# Patient Record
Sex: FEMALE | Race: WHITE | Employment: UNEMPLOYED | URBAN - METROPOLITAN AREA
[De-identification: names, ages, dates, MRNs, and addresses within clinical notes are randomized per-mention and may not be internally consistent; named-entity substitution may affect disease eponyms.]

---

## 2019-01-14 ENCOUNTER — OFFICE VISIT (OUTPATIENT)
Dept: FAMILY MEDICINE CLINIC | Age: 84
End: 2019-01-14
Payer: MEDICARE

## 2019-01-14 VITALS
OXYGEN SATURATION: 98 % | WEIGHT: 130 LBS | BODY MASS INDEX: 25.39 KG/M2 | HEART RATE: 82 BPM | SYSTOLIC BLOOD PRESSURE: 130 MMHG | DIASTOLIC BLOOD PRESSURE: 78 MMHG | RESPIRATION RATE: 18 BRPM | TEMPERATURE: 98.1 F

## 2019-01-14 DIAGNOSIS — M25.531 RIGHT WRIST PAIN: Primary | ICD-10-CM

## 2019-01-14 DIAGNOSIS — Z87.39: ICD-10-CM

## 2019-01-14 DIAGNOSIS — M19.90 ARTHRITIS: ICD-10-CM

## 2019-01-14 PROCEDURE — 4040F PNEUMOC VAC/ADMIN/RCVD: CPT | Performed by: NURSE PRACTITIONER

## 2019-01-14 PROCEDURE — 1090F PRES/ABSN URINE INCON ASSESS: CPT | Performed by: NURSE PRACTITIONER

## 2019-01-14 PROCEDURE — 1036F TOBACCO NON-USER: CPT | Performed by: NURSE PRACTITIONER

## 2019-01-14 PROCEDURE — G8419 CALC BMI OUT NRM PARAM NOF/U: HCPCS | Performed by: NURSE PRACTITIONER

## 2019-01-14 PROCEDURE — G8427 DOCREV CUR MEDS BY ELIG CLIN: HCPCS | Performed by: NURSE PRACTITIONER

## 2019-01-14 PROCEDURE — G8484 FLU IMMUNIZE NO ADMIN: HCPCS | Performed by: NURSE PRACTITIONER

## 2019-01-14 PROCEDURE — 99213 OFFICE O/P EST LOW 20 MIN: CPT | Performed by: NURSE PRACTITIONER

## 2019-01-14 PROCEDURE — 1123F ACP DISCUSS/DSCN MKR DOCD: CPT | Performed by: NURSE PRACTITIONER

## 2019-01-14 PROCEDURE — 1101F PT FALLS ASSESS-DOCD LE1/YR: CPT | Performed by: NURSE PRACTITIONER

## 2019-01-14 RX ORDER — TRAMADOL HYDROCHLORIDE 50 MG/1
50 TABLET ORAL
COMMUNITY
End: 2019-01-14

## 2019-01-14 RX ORDER — DARIFENACIN HYDROBROMIDE 7.5 MG/1
TABLET, EXTENDED RELEASE ORAL
COMMUNITY
Start: 2018-12-15

## 2019-01-14 RX ORDER — BUMETANIDE 2 MG/1
TABLET ORAL
COMMUNITY
Start: 2018-12-11

## 2019-01-14 RX ORDER — FUROSEMIDE 20 MG/1
TABLET ORAL
COMMUNITY
Start: 2018-11-21

## 2019-01-14 RX ORDER — ATORVASTATIN CALCIUM 10 MG/1
TABLET, FILM COATED ORAL
COMMUNITY
Start: 2018-11-24

## 2019-01-14 RX ORDER — MEMANTINE HYDROCHLORIDE 10 MG/1
10 TABLET ORAL
COMMUNITY

## 2019-01-14 RX ORDER — WARFARIN SODIUM 4 MG/1
4 TABLET ORAL
COMMUNITY

## 2019-01-14 RX ORDER — RIVASTIGMINE TARTRATE 1.5 MG/1
CAPSULE ORAL
COMMUNITY
Start: 2018-12-10

## 2019-01-14 RX ORDER — WARFARIN SODIUM 2 MG/1
2 TABLET ORAL
COMMUNITY

## 2019-01-14 RX ORDER — OXYBUTYNIN CHLORIDE 5 MG/1
5 TABLET ORAL
COMMUNITY
End: 2019-01-14

## 2020-09-01 ENCOUNTER — OFFICE VISIT (OUTPATIENT)
Dept: FAMILY MEDICINE CLINIC | Age: 85
End: 2020-09-01
Payer: MEDICARE

## 2020-09-01 VITALS
DIASTOLIC BLOOD PRESSURE: 80 MMHG | WEIGHT: 124 LBS | SYSTOLIC BLOOD PRESSURE: 169 MMHG | OXYGEN SATURATION: 96 % | BODY MASS INDEX: 21.97 KG/M2 | TEMPERATURE: 98.2 F | HEIGHT: 63 IN | RESPIRATION RATE: 16 BRPM | HEART RATE: 72 BPM

## 2020-09-01 PROCEDURE — 90471 IMMUNIZATION ADMIN: CPT | Performed by: NURSE PRACTITIONER

## 2020-09-01 PROCEDURE — 90715 TDAP VACCINE 7 YRS/> IM: CPT | Performed by: NURSE PRACTITIONER

## 2020-09-01 PROCEDURE — 99202 OFFICE O/P NEW SF 15 MIN: CPT | Performed by: NURSE PRACTITIONER

## 2020-09-01 RX ORDER — DOXYCYCLINE HYCLATE 100 MG
100 TABLET ORAL 2 TIMES DAILY
Qty: 14 TABLET | Refills: 0 | Status: SHIPPED | OUTPATIENT
Start: 2020-09-01 | End: 2020-09-08

## 2020-09-01 ASSESSMENT — PATIENT HEALTH QUESTIONNAIRE - PHQ9
SUM OF ALL RESPONSES TO PHQ9 QUESTIONS 1 & 2: 0
2. FEELING DOWN, DEPRESSED OR HOPELESS: 0
1. LITTLE INTEREST OR PLEASURE IN DOING THINGS: 0
SUM OF ALL RESPONSES TO PHQ QUESTIONS 1-9: 0
SUM OF ALL RESPONSES TO PHQ QUESTIONS 1-9: 0

## 2020-09-01 ASSESSMENT — ENCOUNTER SYMPTOMS
ABDOMINAL PAIN: 0
SHORTNESS OF BREATH: 0
SORE THROAT: 0
VOMITING: 0
SINUS PAIN: 0
COUGH: 0
DIARRHEA: 0
NAUSEA: 0

## 2020-09-01 NOTE — PROGRESS NOTES
[address] [facility]  [address] [facility] [address] [address]  Dept: [phone]  Dept Fax: [phone]    Cheli Hernández is a 80 y.o. female who presents to the urgent care today for her medicalconditions/complaints as noted below. Cheli Hernández is c/o of Extremity Laceration (skin tear to the left forearm from her dog scratching the skin on Sunday  just wants to make sure not infected )      HPI:     25-year-old female patient presents with complaint of skin tear to the left forearm. Patient reports that a dog jumped up on her lap 2 days ago causing a 1.5 cm skin tear to the posterior left forearm. The superficial skin layer was folded underneath leaving exposed tissue. Patient does take Coumadin reports that she had mild persisting bleeding that night. Patient has mild redness/bruising to the surrounding tissue. There is no streaking extending up the forearm. There is no fevers or chills. Denies nausea or vomiting. Unsure of last tetanus. Patient describes the injury as a dog scratch and not a dog bite. Past Medical History:   Diagnosis Date    Hypertension     Pseudogout         Current Outpatient Medications   Medication Sig Dispense Refill    doxycycline hyclate (VIBRA-TABS) 100 MG tablet Take 1 tablet by mouth 2 times daily for 7 days 14 tablet 0    mupirocin (BACTROBAN) 2 % ointment Apply topically 2 times daily for 7 days Apply 3 times daily.  15 g 0    rivastigmine (EXELON) 1.5 MG capsule       metoprolol tartrate (LOPRESSOR) 25 MG tablet       furosemide (LASIX) 20 MG tablet       darifenacin (ENABLEX) 7.5 MG extended release tablet       bumetanide (BUMEX) 2 MG tablet       memantine (NAMENDA) 10 MG tablet Take 10 mg by mouth      warfarin (COUMADIN) 2 MG tablet Take 2 mg by mouth      warfarin (COUMADIN) 4 MG tablet Take 4 mg by mouth      atorvastatin (LIPITOR) 10 MG tablet       bumetanide (BUMEX) 1 MG tablet Take 1 mg by mouth daily      aspirin 81 MG tablet Take 81 mg by mouth daily      oxybutynin (DITROPAN) 5 MG tablet Take 5 mg by mouth 2 times daily      metoprolol (TOPROL-XL) 25 MG XL tablet Take 25 mg by mouth daily      estrogens, conjugated, (PREMARIN) 0.625 MG tablet Take 0.625 mg by mouth daily      traMADol (ULTRAM) 50 MG tablet Take 50 mg by mouth every 6 hours as needed for Pain       No current facility-administered medications for this visit. Allergies   Allergen Reactions    Hydrocodone-Acetaminophen Itching and Rash    Aspirin Rash    Benzoic Acid Rash    Iv Contrast [Iodides] Rash    Penicillins Rash       Subjective:      Review of Systems   Constitutional: Negative for chills and fever. HENT: Negative for ear pain, sinus pain and sore throat. Respiratory: Negative for cough and shortness of breath. Cardiovascular: Negative for chest pain and palpitations. Gastrointestinal: Negative for abdominal pain, diarrhea, nausea and vomiting. Skin: Positive for wound (lt forearm). Neurological: Negative for dizziness and headaches. All other systems reviewed and are negative. Objective:     Physical Exam  Vitals signs and nursing note reviewed. Cardiovascular:      Rate and Rhythm: Normal rate. Pulmonary:      Effort: Pulmonary effort is normal.      Breath sounds: Normal breath sounds. Musculoskeletal:        Arms:    Skin:     General: Skin is warm and dry. Neurological:      General: No focal deficit present. Mental Status: She is oriented to person, place, and time. BP (!) 188/48 (Site: Right Upper Arm, Position: Sitting, Cuff Size: Medium Adult)   Pulse 72   Temp 98.2 °F (36.8 °C) (Oral)   Resp 16   Ht 5' 3\" (1.6 m)   Wt 124 lb (56.2 kg)   SpO2 96%   BMI 21.97 kg/m²   Lab Review   No visits with results within 2 Month(s) from this visit.    Latest known visit with results is:   Hospital Outpatient Visit on 01/04/2016   Component Date Value    Alb 01/04/2016 3.1*    Glucose 01/04/2016 137*    BUN 01/04/2016 26*    CREATININE 01/04/2016 0.83     Bun/Cre Ratio 01/04/2016 NOT REPORTED     Calcium 01/04/2016 8.5*    Sodium 01/04/2016 143     Potassium 01/04/2016 4.3     Chloride 01/04/2016 104     CO2 01/04/2016 25     Anion Gap 01/04/2016 14     GFR Non- 01/04/2016 >60     GFR  01/04/2016 >60     GFR Comment 01/04/2016          GFR Staging 01/04/2016 NOT REPORTED     Protime 01/04/2016 25.5*    INR 01/04/2016 2.3        Assessment:       Diagnosis Orders   1. Skin tear of left forearm without complication, initial encounter  Tdap (age 6y and older) IM (BOOSTRIX)    doxycycline hyclate (VIBRA-TABS) 100 MG tablet    mupirocin (BACTROBAN) 2 % ointment       Plan:      Return if symptoms worsen or fail to improve. Orders Placed This Encounter   Medications    doxycycline hyclate (VIBRA-TABS) 100 MG tablet     Sig: Take 1 tablet by mouth 2 times daily for 7 days     Dispense:  14 tablet     Refill:  0    mupirocin (BACTROBAN) 2 % ointment     Sig: Apply topically 2 times daily for 7 days Apply 3 times daily. Dispense:  15 g     Refill:  0       Immunizations Administered     Name Date Dose Route    Tdap (Boostrix, Adacel) 9/1/2020 0.5 mL Intramuscular    Site: Deltoid- Left    Lot: [id number]    NDC: 90923-322-98        Tetanus updated. Discussed doxycycline dose/duration. Discussed Bactroban dose/duration. Recommend wound check in 4 days. Return as needed, follow-up with PCP             Patient given educational materials - see patient instructions. Discussed use, benefit, and side effects of prescribed medications. All patientquestions answered. Pt voiced understanding. This note was transcribed using dictation with Dragon services. Efforts were made to correct any errors but some words may be misinterpreted.      Electronically signed by CHELO Elder CNP on 9/1/2020at 11:43 AM

## 2020-09-01 NOTE — PATIENT INSTRUCTIONS
Patient Education        Skin Tears: Care Instructions  Your Care Instructions  As we get older, our skin gets drier and more fragile. Sometimes this can cause the outer layers of skin to split and tear open. Skin tears are treated in different ways. In some cases, doctors use pieces of tape called Steri-Strips to pull the skin together and help it heal. Other times, it's best to leave the tear open and cover it with a special wound-care bandage. Skin tears are usually not serious. They usually heal in a few weeks. But how long you take to heal depends on your body and the type of tear you have. Sometimes the torn piece of skin is used to protect the wound while it heals. But that piece of skin does not heal. It may fall off on its own. Or the doctor may remove it. As your tear heals, it's important to keep it clean to help prevent infection. The doctor has checked you carefully, but problems can develop later. If you notice any problems or new symptoms, get medical treatment right away. Follow-up care is a key part of your treatment and safety. Be sure to make and go to all appointments, and call your doctor if you are having problems. It's also a good idea to know your test results and keep a list of the medicines you take. How can you care for yourself at home? · If you have pain, ask your doctor if you can take an over-the-counter pain medicine, such as acetaminophen (Tylenol), ibuprofen (Advil, Motrin), or naproxen (Aleve). Be safe with medicines. Read and follow all instructions on the label. · If you have a bandage, follow your doctor's instructions for changing it. · If you have Steri-Strips, leave them on until they fall off. · Follow your doctor's instructions about bathing. · Gently wash the skin tear with plain water 2 times a day. Do not rub the area. · Let the area air dry. Or you can pat it carefully with a soft towel. When should you call for help?    Call your doctor now or seek immediate medical care if:  · You have signs of infection, such as:  ? Increased pain, swelling, warmth, or redness around the tear. ? Red streaks leading from the tear. ? Pus draining from the tear. ? A fever. · The tear starts to bleed a lot. Small amounts of blood are normal.  Watch closely for changes in your health, and be sure to contact your doctor if:  · You do not get better as expected. Where can you learn more? Go to https://[employer]idya. org and sign in to your [employer] account. Enter X580 in the Shared Performance box to learn more about \"Skin Tears: Care Instructions. \"     If you do not have an account, please click on the \"Sign Up Now\" link. Current as of: June 26, 2019               Content Version: 12.5  © 2755-8981 [employer]. Care instructions adapted under license by [facility] ([facility]). If you have questions about a medical condition or this instruction, always ask your healthcare professional. [address] any warranty or liability for your use of this information.

## 2020-11-25 ENCOUNTER — OFFICE VISIT (OUTPATIENT)
Dept: FAMILY MEDICINE CLINIC | Age: 85
End: 2020-11-25
Payer: MEDICARE

## 2020-11-25 VITALS — HEART RATE: 76 BPM | OXYGEN SATURATION: 96 % | TEMPERATURE: 97.2 F

## 2020-11-25 PROCEDURE — 99212 OFFICE O/P EST SF 10 MIN: CPT | Performed by: NURSE PRACTITIONER

## 2020-11-25 RX ORDER — OFLOXACIN 3 MG/ML
1 SOLUTION/ DROPS OPHTHALMIC 4 TIMES DAILY
Qty: 10 ML | Refills: 0 | Status: SHIPPED | OUTPATIENT
Start: 2020-11-25 | End: 2020-12-02

## 2020-11-25 ASSESSMENT — ENCOUNTER SYMPTOMS
EYE DISCHARGE: 1
ABDOMINAL PAIN: 0
SORE THROAT: 0
VOMITING: 0
EYE ITCHING: 1
EYE REDNESS: 1
DIARRHEA: 0
NAUSEA: 0
SHORTNESS OF BREATH: 0
SINUS PAIN: 0
COUGH: 0

## 2020-11-25 NOTE — PATIENT INSTRUCTIONS
Patient Education        Pinkeye: Care Instructions  Your Care Instructions     Pinkeye is redness and swelling of the eye surface and the conjunctiva (the lining of the eyelid and the covering of the white part of the eye). Pinkeye is also called conjunctivitis. Pinkeye is often caused by infection with bacteria or a virus. Dry air, allergies, smoke, and chemicals are other common causes. Pinkeye often clears on its own in 7 to 10 days. Antibiotics only help if the pinkeye is caused by bacteria. Pinkeye caused by infection spreads easily. If an allergy or chemical is causing pinkeye, it will not go away unless you can avoid whatever is causing it. Follow-up care is a key part of your treatment and safety. Be sure to make and go to all appointments, and call your doctor if you are having problems. It's also a good idea to know your test results and keep a list of the medicines you take. How can you care for yourself at home? · Wash your hands often. Always wash them before and after you treat pinkeye or touch your eyes or face. · Use moist cotton or a clean, wet cloth to remove crust. Wipe from the inside corner of the eye to the outside. Use a clean part of the cloth for each wipe. · Put cold or warm wet cloths on your eye a few times a day if the eye hurts. · Do not wear contact lenses or eye makeup until the pinkeye is gone. Throw away any eye makeup you were using when you got pinkeye. Clean your contacts and storage case. If you wear disposable contacts, use a new pair when your eye has cleared and it is safe to wear contacts again. · If the doctor gave you antibiotic ointment or eyedrops, use them as directed. Use the medicine for as long as instructed, even if your eye starts looking better soon. Keep the bottle tip clean, and do not let it touch the eye area. · To put in eyedrops or ointment:  ? Tilt your head back, and pull your lower eyelid down with one finger. ?  Drop or squirt the

## 2020-11-25 NOTE — PROGRESS NOTES
[address]  [address] [place] [address] [address]  Dept: [phone]  Dept Fax: [phone]    Thor Prather is a 80 y.o. female who presents to the urgent care today for her medicalconditions/complaints as noted below. Thor Prather is c/o of Eye Problem      HPI:       25-year-old female patient presents with complaint of left eye redness. Reports symptoms ongoing for approximately 3 to 4 days. Reports the left eye has been reddened, watering with itching. Denies significant drainage. Reports a small amount of crust in the mornings. Denies vision changes or eye pain. Denies painful movements of the eye. Denies history of eye problems or surgeries. Denies headache or dizziness.       Past Medical History:   Diagnosis Date    Hypertension     Pseudogout         Current Outpatient Medications   Medication Sig Dispense Refill    ofloxacin (OCUFLOX) 0.3 % solution Place 1 drop into both eyes 4 times daily for 7 days 10 mL 0    rivastigmine (EXELON) 1.5 MG capsule       metoprolol tartrate (LOPRESSOR) 25 MG tablet       furosemide (LASIX) 20 MG tablet       darifenacin (ENABLEX) 7.5 MG extended release tablet       bumetanide (BUMEX) 2 MG tablet       memantine (NAMENDA) 10 MG tablet Take 10 mg by mouth      warfarin (COUMADIN) 2 MG tablet Take 2 mg by mouth      warfarin (COUMADIN) 4 MG tablet Take 4 mg by mouth      atorvastatin (LIPITOR) 10 MG tablet       bumetanide (BUMEX) 1 MG tablet Take 1 mg by mouth daily      aspirin 81 MG tablet Take 81 mg by mouth daily      oxybutynin (DITROPAN) 5 MG tablet Take 5 mg by mouth 2 times daily      metoprolol (TOPROL-XL) 25 MG XL tablet Take 25 mg by mouth daily      estrogens, conjugated, (PREMARIN) 0.625 MG tablet Take 0.625 mg by mouth daily      traMADol (ULTRAM) 50 MG tablet Take 50 mg by mouth every 6 hours as needed for Pain       No current facility-administered medications for this visit. Allergies   Allergen Reactions    Hydrocodone-Acetaminophen Itching and Rash    Aspirin Rash    Benzoic Acid Rash    Iv Contrast [Iodides] Rash    Penicillins Rash       Subjective:      Review of Systems   Constitutional: Negative for chills and fever. HENT: Negative for ear pain, sinus pain and sore throat. Eyes: Positive for discharge (tearing), redness and itching. Respiratory: Negative for cough and shortness of breath. Cardiovascular: Negative for chest pain and palpitations. Gastrointestinal: Negative for abdominal pain, diarrhea, nausea and vomiting. Neurological: Negative for dizziness and headaches. All other systems reviewed and are negative. Objective:     Physical Exam  Vitals signs and nursing note reviewed. Constitutional:       General: She is not in acute distress. Appearance: Normal appearance. She is not toxic-appearing. HENT:      Nose: Nose normal.      Mouth/Throat:      Mouth: Mucous membranes are moist.   Eyes:      General: Lids are normal. Lids are everted, no foreign bodies appreciated. Extraocular Movements: Extraocular movements intact. Conjunctiva/sclera:      Right eye: Right conjunctiva is not injected. Left eye: Left conjunctiva is injected. Pupils: Pupils are equal, round, and reactive to light. Cardiovascular:      Rate and Rhythm: Normal rate. Pulmonary:      Effort: Pulmonary effort is normal.      Breath sounds: Normal breath sounds. Skin:     General: Skin is warm and dry. Neurological:      General: No focal deficit present. Mental Status: She is alert and oriented to person, place, and time. Pulse 76   Temp 97.2 °F (36.2 °C)   SpO2 96%   Lab Review   No visits with results within 2 Month(s) from this visit.    Latest known visit with results is:   Hospital Outpatient Visit on 01/04/2016   Component Date Value    Alb 01/04/2016 3.1*    Glucose 01/04/2016 137*    BUN 01/04/2016 26*    CREATININE 01/04/2016 0.83     Bun/Cre Ratio 01/04/2016 NOT REPORTED     Calcium 01/04/2016 8.5*    Sodium 01/04/2016 143     Potassium 01/04/2016 4.3     Chloride 01/04/2016 104     CO2 01/04/2016 25     Anion Gap 01/04/2016 14     GFR Non- 01/04/2016 >60     GFR  01/04/2016 >60     GFR Comment 01/04/2016          GFR Staging 01/04/2016 NOT REPORTED     Protime 01/04/2016 25.5*    INR 01/04/2016 2.3        Assessment:       Diagnosis Orders   1. Redness of left eye  ofloxacin (OCUFLOX) 0.3 % solution    Lacie Escobar MD, Ophthalmology, [facility]:      Return if symptoms worsen or fail to improve. Orders Placed This Encounter   Medications    ofloxacin (OCUFLOX) 0.3 % solution     Sig: Place 1 drop into both eyes 4 times daily for 7 days     Dispense:  10 mL     Refill:  0           Based on the clinical exam findings-- I will treat this as a bacterial conjunctivitis at this time with antibiotic eye gtts. Cool compresses to the eyes if desired. Good hand hygiene encouraged. Patient agreeable to treatment plan. Educational materials provided on AVS.  Follow up if symptoms do not improve/worsen. Eye specialist order placed for follow up      Patient given educational materials - see patient instructions. Discussed use, benefit, and side effects of prescribed medications. All patientquestions answered. Pt voiced understanding. This note was transcribed using dictation with Dragon services. Efforts were made to correct any errors but some words may be misinterpreted.      Electronically signed by CHELO Blue CNP on 11/25/2020at 3:06 PM